# Patient Record
Sex: FEMALE | Race: WHITE | NOT HISPANIC OR LATINO | Employment: FULL TIME | URBAN - METROPOLITAN AREA
[De-identification: names, ages, dates, MRNs, and addresses within clinical notes are randomized per-mention and may not be internally consistent; named-entity substitution may affect disease eponyms.]

---

## 2017-01-10 ENCOUNTER — OFFICE VISIT (OUTPATIENT)
Dept: URGENT CARE | Facility: CLINIC | Age: 52
End: 2017-01-10
Payer: COMMERCIAL

## 2017-01-10 ENCOUNTER — HOSPITAL ENCOUNTER (OUTPATIENT)
Facility: MEDICAL CENTER | Age: 52
End: 2017-01-10
Attending: NURSE PRACTITIONER
Payer: COMMERCIAL

## 2017-01-10 VITALS
WEIGHT: 191 LBS | DIASTOLIC BLOOD PRESSURE: 88 MMHG | SYSTOLIC BLOOD PRESSURE: 126 MMHG | TEMPERATURE: 98.8 F | HEIGHT: 61 IN | OXYGEN SATURATION: 99 % | HEART RATE: 72 BPM | RESPIRATION RATE: 16 BRPM | BODY MASS INDEX: 36.06 KG/M2

## 2017-01-10 DIAGNOSIS — N30.90 CYSTITIS: ICD-10-CM

## 2017-01-10 DIAGNOSIS — N12 PYELONEPHRITIS: ICD-10-CM

## 2017-01-10 LAB
APPEARANCE UR: CLEAR
BILIRUB UR STRIP-MCNC: NEGATIVE MG/DL
COLOR UR AUTO: YELLOW
GLUCOSE UR STRIP.AUTO-MCNC: NEGATIVE MG/DL
KETONES UR STRIP.AUTO-MCNC: NEGATIVE MG/DL
LEUKOCYTE ESTERASE UR QL STRIP.AUTO: NORMAL
NITRITE UR QL STRIP.AUTO: NEGATIVE
PH UR STRIP.AUTO: 6.5 [PH] (ref 5–8)
PROT UR QL STRIP: NEGATIVE MG/DL
RBC UR QL AUTO: NORMAL
SP GR UR STRIP.AUTO: 1.02
UROBILINOGEN UR STRIP-MCNC: 0.2 MG/DL

## 2017-01-10 PROCEDURE — 81002 URINALYSIS NONAUTO W/O SCOPE: CPT | Performed by: NURSE PRACTITIONER

## 2017-01-10 PROCEDURE — 99000 SPECIMEN HANDLING OFFICE-LAB: CPT | Performed by: NURSE PRACTITIONER

## 2017-01-10 PROCEDURE — 96372 THER/PROPH/DIAG INJ SC/IM: CPT | Performed by: NURSE PRACTITIONER

## 2017-01-10 PROCEDURE — 99214 OFFICE O/P EST MOD 30 MIN: CPT | Mod: 25 | Performed by: NURSE PRACTITIONER

## 2017-01-10 PROCEDURE — 87086 URINE CULTURE/COLONY COUNT: CPT

## 2017-01-10 RX ORDER — CEFTRIAXONE 1 G/1
1 INJECTION, POWDER, FOR SOLUTION INTRAMUSCULAR; INTRAVENOUS ONCE
Status: COMPLETED | OUTPATIENT
Start: 2017-01-10 | End: 2017-01-10

## 2017-01-10 RX ORDER — SULFAMETHOXAZOLE AND TRIMETHOPRIM 800; 160 MG/1; MG/1
1 TABLET ORAL 2 TIMES DAILY
Qty: 20 TAB | Refills: 0 | Status: SHIPPED | OUTPATIENT
Start: 2017-01-10 | End: 2017-01-20

## 2017-01-10 RX ADMIN — CEFTRIAXONE 1 G: 1 INJECTION, POWDER, FOR SOLUTION INTRAMUSCULAR; INTRAVENOUS at 17:42

## 2017-01-10 ASSESSMENT — ENCOUNTER SYMPTOMS
CHILLS: 1
VOMITING: 0
EYES NEGATIVE: 1
RESPIRATORY NEGATIVE: 1
CARDIOVASCULAR NEGATIVE: 1
NAUSEA: 1
FLANK PAIN: 1
ABDOMINAL PAIN: 1
FEVER: 1
MYALGIAS: 1

## 2017-01-10 NOTE — MR AVS SNAPSHOT
"Leigh Ann Fox   1/10/2017 4:45 PM   Office Visit   MRN: [id number]    Department:  [facility]   Dept Phone:  [phone]    Description:  Female : 1965   Provider:  Cathey J Hamman, A.P.N.           Reason for Visit     Dysuria blood, bits of flesh, lower abdomen pain and a lot of body aches       Allergies as of 1/10/2017     No Known Allergies      You were diagnosed with     Pyelonephritis   [[id number]]       Cystitis   [[id number]]         Vital Signs     Blood Pressure Pulse Temperature Respirations Height Weight    126/88 mmHg 72 37.1 °C (98.8 °F) 16 1.549 m (5' 1\") 86.637 kg (191 lb)    Body Mass Index Oxygen Saturation Breastfeeding? Smoking Status          36.11 kg/m2 99% No Never Smoker         Basic Information     Date Of Birth Sex Race Ethnicity Preferred Language    1965 Female White Non- English      Health Maintenance        Date Due Completion Dates    IMM DTaP/Tdap/Td Vaccine (1 - Tdap) 1986 ---    PAP SMEAR 1988 ---    MAMMOGRAM 2007 ---    IMM INFLUENZA (1) 2016 ---            Results     POCT Urinalysis      Component Value Standard Range & Units    POC Color YELLOW Negative    POC Appearance CLEAR Negative    POC Leukocyte Esterase MODERATE Negative    POC Nitrites NEGATIVE Negative    POC Urobiligen 0.2 Negative (0.2) mg/dL    POC Protein NEGATIVE Negative mg/dL    POC Urine PH 6.5 5.0 - 8.0    POC Blood MODERATE Negative    POC Specific Gravity 1.020 <1.005 - >1.030    POC Ketones NEGATIVE Negative mg/dL    POC Biliruben NEGATIVE Negative mg/dL    POC Glucose NEGATIVE Negative mg/dL                        Current Immunizations     No immunizations on file.      Below and/or attached are the medications your provider expects you to take. Review all of your home medications and newly ordered medications with your provider and/or pharmacist. Follow medication instructions as directed by your provider and/or pharmacist. Please keep your medication " list with you and share with your provider. Update the information when medications are discontinued, doses are changed, or new medications (including over-the-counter products) are added; and carry medication information at all times in the event of emergency situations     Allergies:  No Known Allergies          Medications  Valid as of: January 10, 2017 -  6:28 PM    Generic Name Brand Name Tablet Size Instructions for use    Sulfamethoxazole-Trimethoprim (Tab) BACTRIM -160 MG Take 1 Tab by mouth 2 times a day.        Sulfamethoxazole-Trimethoprim (Tab) BACTRIM -160 MG Take 1 Tab by mouth 2 times a day for 10 days.        .                 Medicines prescribed today were sent to:     [facility] - [place], [place] - [address]    [address] [place] [address]    Phone: [phone] Fax: [phone]    Open 24 Hours?: No      Medication refill instructions:       If your prescription bottle indicates you have medication refills left, it is not necessary to call your provider’s office. Please contact your pharmacy and they will refill your medication.    If your prescription bottle indicates you do not have any refills left, you may request refills at any time through one of the following ways: The online [employer] system (except Urgent Care), by calling your provider’s office, or by asking your pharmacy to contact your provider’s office with a refill request. Medication refills are processed only during regular business hours and may not be available until the next business day. Your provider may request additional information or to have a follow-up visit with you prior to refilling your medication.   *Please Note: Medication refills are assigned a new Rx number when refilled electronically. Your pharmacy may indicate that no refills were authorized even though a new prescription for the same medication is available at the pharmacy. Please request the medicine by name with the  pharmacy before contacting your provider for a refill.        Your To Do List     Future Labs/Procedures Complete By Expires    URINE CULTURE(NEW)  As directed 1/10/2018      Instructions    Urinary Tract Infection  Urinary tract infections (UTIs) can develop anywhere along your urinary tract. Your urinary tract is your body's drainage system for removing wastes and extra water. Your urinary tract includes two kidneys, two ureters, a bladder, and a urethra. Your kidneys are a pair of bean-shaped organs. Each kidney is about the size of your fist. They are located below your ribs, one on each side of your spine.  CAUSES  Infections are caused by microbes, which are microscopic organisms, including fungi, viruses, and bacteria. These organisms are so small that they can only be seen through a microscope. Bacteria are the microbes that most commonly cause UTIs.  SYMPTOMS   Symptoms of UTIs may vary by age and gender of the patient and by the location of the infection. Symptoms in young women typically include a frequent and intense urge to urinate and a painful, burning feeling in the bladder or urethra during urination. Older women and men are more likely to be tired, shaky, and weak and have muscle aches and abdominal pain. A fever may mean the infection is in your kidneys. Other symptoms of a kidney infection include pain in your back or sides below the ribs, nausea, and vomiting.  DIAGNOSIS  To diagnose a UTI, your caregiver will ask you about your symptoms. Your caregiver also will ask to provide a urine sample. The urine sample will be tested for bacteria and white blood cells. White blood cells are made by your body to help fight infection.  TREATMENT   Typically, UTIs can be treated with medication. Because most UTIs are caused by a bacterial infection, they usually can be treated with the use of antibiotics. The choice of antibiotic and length of treatment depend on your symptoms and the type of bacteria  causing your infection.  HOME CARE INSTRUCTIONS  · If you were prescribed antibiotics, take them exactly as your caregiver instructs you. Finish the medication even if you feel better after you have only taken some of the medication.  · Drink enough water and fluids to keep your urine clear or pale yellow.  · Avoid caffeine, tea, and carbonated beverages. They tend to irritate your bladder.  · Empty your bladder often. Avoid holding urine for long periods of time.  · Empty your bladder before and after sexual intercourse.  · After a bowel movement, women should cleanse from front to back. Use each tissue only once.  SEEK MEDICAL CARE IF:   · You have back pain.  · You develop a fever.  · Your symptoms do not begin to resolve within 3 days.  SEEK IMMEDIATE MEDICAL CARE IF:   · You have severe back pain or lower abdominal pain.  · You develop chills.  · You have nausea or vomiting.  · You have continued burning or discomfort with urination.  MAKE SURE YOU:   · Understand these instructions.  · Will watch your condition.  · Will get help right away if you are not doing well or get worse.     This information is not intended to replace advice given to you by your health care provider. Make sure you discuss any questions you have with your health care provider.     Document Released: 09/27/2006 Document Revised: 01/08/2016 Document Reviewed: 01/25/2013  [employer] Interactive Patient Education ©2016 Elsevier Inc.  Pyelonephritis, Adult  Pyelonephritis is a kidney infection. In general, there are 2 main types of pyelonephritis:  · Infections that come on quickly without any warning (acute pyelonephritis).  · Infections that persist for a long period of time (chronic pyelonephritis).  CAUSES   Two main causes of pyelonephritis are:  · Bacteria traveling from the bladder to the kidney. This is a problem especially in pregnant women. The urine in the bladder can become filled with bacteria from multiple causes,  including:  · Inflammation of the prostate gland (prostatitis).  · Sexual intercourse in females.  · Bladder infection (cystitis).  · Bacteria traveling from the bloodstream to the tissue part of the kidney.  Problems that may increase your risk of getting a kidney infection include:  · Diabetes.  · Kidney stones or bladder stones.  · Cancer.  · Catheters placed in the bladder.  · Other abnormalities of the kidney or ureter.  SYMPTOMS   · Abdominal pain.  · Pain in the side or flank area.  · Fever.  · Chills.  · Upset stomach.  · Blood in the urine (dark urine).  · Frequent urination.  · Strong or persistent urge to urinate.  · Burning or stinging when urinating.  DIAGNOSIS   Your caregiver may diagnose your kidney infection based on your symptoms. A urine sample may also be taken.  TREATMENT   In general, treatment depends on how severe the infection is.   · If the infection is mild and caught early, your caregiver may treat you with oral antibiotics and send you home.  · If the infection is more severe, the bacteria may have gotten into the bloodstream. This will require intravenous (IV) antibiotics and a hospital stay. Symptoms may include:  ¨ High fever.  ¨ Severe flank pain.  ¨ Shaking chills.  · Even after a hospital stay, your caregiver may require you to be on oral antibiotics for a period of time.  · Other treatments may be required depending upon the cause of the infection.  HOME CARE INSTRUCTIONS   · Take your antibiotics as directed. Finish them even if you start to feel better.  · Make an appointment to have your urine checked to make sure the infection is gone.  · Drink enough fluids to keep your urine clear or pale yellow.  · Take medicines for the bladder if you have urgency and frequency of urination as directed by your caregiver.  SEEK IMMEDIATE MEDICAL CARE IF:   · You have a fever or persistent symptoms for more than 2-3 days.  · You have a fever and your symptoms suddenly get worse.  · You are  unable to take your antibiotics or fluids.  · You develop shaking chills.  · You experience extreme weakness or fainting.  · There is no improvement after 2 days of treatment.  MAKE SURE YOU:  · Understand these instructions.  · Will watch your condition.  · Will get help right away if you are not doing well or get worse.     This information is not intended to replace advice given to you by your health care provider. Make sure you discuss any questions you have with your health care provider.     Document Released: 12/18/2006 Document Revised: 06/18/2013 Document Reviewed: 05/23/2012  [employer] Interactive Patient Education ©2016 Elsevier Inc.            Redeem[employer]Get Access Code: [email or web address]  Expires: 1/25/2017  8:47 AM    Your email address is not on file at [facility].  Email Addresses are required for you to sign up for Redeem[employer]Get, please contact [phone] to verify your personal information and to provide your email address prior to attempting to register for Redeem&Get.    Leigh Ann Fox  [id number]  [place] [address]    Redeem&Get  A secure, online tool to manage your health information     [facility]’s Redeem&Get® is a secure, online tool that connects you to your personalized health information from the privacy of your home -- day or night - making it very easy for you to manage your healthcare. Once the activation process is completed, you can even access your medical information using the Redeem&Get a[place], which is available for free in the [employer] [place] store or Google Play store.     To learn more about Redeem&Get, visit [email or web address]    There are two levels of access available (as shown below):   My Chart Features  [facility] Primary Care Doctor [facility]  Specialists [facility]  Urgent  Care Non-[facility] Primary Care Doctor   Email your healthcare team securely and privately 24/7 X X X    Manage appointments: schedule your next appointment; view details of past/upcoming appointments X      Request  prescription refills. X      View recent personal medical records, including lab and immunizations X X X X   View health record, including health history, allergies, medications X X X X   Read reports about your outpatient visits, procedures, consult and ER notes X X X X   See your discharge summary, which is a recap of your hospital and/or ER visit that includes your diagnosis, lab results, and care plan X X  X     How to register for [employer]:  Once your e-mail address has been verified, follow the following steps to sign up for [employer].     1. Go to  [email or web address]  2. Click on the Sign Up Now box, which takes you to the New Member Sign Up page. You will need to provide the following information:  a. Enter your [employer] Access Code exactly as it appears at the top of this page. (You will not need to use this code after you’ve completed the sign-up process. If you do not sign up before the expiration date, you must request a new code.)   b. Enter your date of birth.   c. Enter your home email address.   d. Click Submit, and follow the next screen’s instructions.  3. Create a [employer] Bridge ID. This will be your [employer] login ID and cannot be changed, so think of one that is secure and easy to remember.  4. Create a [employer] password. You can change your password at any time.  5. Enter your Password Reset Question and Answer. This can be used at a later time if you forget your password.   6. Enter your e-mail address. This allows you to receive e-mail notifications when new information is available in [employer].  7. Click Sign Up. You can now view your health information.    For assistance activating your [employer] account, call [phone]

## 2017-01-11 NOTE — PATIENT INSTRUCTIONS
Urinary Tract Infection  Urinary tract infections (UTIs) can develop anywhere along your urinary tract. Your urinary tract is your body's drainage system for removing wastes and extra water. Your urinary tract includes two kidneys, two ureters, a bladder, and a urethra. Your kidneys are a pair of bean-shaped organs. Each kidney is about the size of your fist. They are located below your ribs, one on each side of your spine.  CAUSES  Infections are caused by microbes, which are microscopic organisms, including fungi, viruses, and bacteria. These organisms are so small that they can only be seen through a microscope. Bacteria are the microbes that most commonly cause UTIs.  SYMPTOMS   Symptoms of UTIs may vary by age and gender of the patient and by the location of the infection. Symptoms in young women typically include a frequent and intense urge to urinate and a painful, burning feeling in the bladder or urethra during urination. Older women and men are more likely to be tired, shaky, and weak and have muscle aches and abdominal pain. A fever may mean the infection is in your kidneys. Other symptoms of a kidney infection include pain in your back or sides below the ribs, nausea, and vomiting.  DIAGNOSIS  To diagnose a UTI, your caregiver will ask you about your symptoms. Your caregiver also will ask to provide a urine sample. The urine sample will be tested for bacteria and white blood cells. White blood cells are made by your body to help fight infection.  TREATMENT   Typically, UTIs can be treated with medication. Because most UTIs are caused by a bacterial infection, they usually can be treated with the use of antibiotics. The choice of antibiotic and length of treatment depend on your symptoms and the type of bacteria causing your infection.  HOME CARE INSTRUCTIONS  · If you were prescribed antibiotics, take them exactly as your caregiver instructs you. Finish the medication even if you feel better after you  have only taken some of the medication.  · Drink enough water and fluids to keep your urine clear or pale yellow.  · Avoid caffeine, tea, and carbonated beverages. They tend to irritate your bladder.  · Empty your bladder often. Avoid holding urine for long periods of time.  · Empty your bladder before and after sexual intercourse.  · After a bowel movement, women should cleanse from front to back. Use each tissue only once.  SEEK MEDICAL CARE IF:   · You have back pain.  · You develop a fever.  · Your symptoms do not begin to resolve within 3 days.  SEEK IMMEDIATE MEDICAL CARE IF:   · You have severe back pain or lower abdominal pain.  · You develop chills.  · You have nausea or vomiting.  · You have continued burning or discomfort with urination.  MAKE SURE YOU:   · Understand these instructions.  · Will watch your condition.  · Will get help right away if you are not doing well or get worse.     This information is not intended to replace advice given to you by your health care provider. Make sure you discuss any questions you have with your health care provider.     Document Released: 09/27/2006 Document Revised: 01/08/2016 Document Reviewed: 01/25/2013  [employer] Interactive Patient Education ©2016 Elsevier Inc.  Pyelonephritis, Adult  Pyelonephritis is a kidney infection. In general, there are 2 main types of pyelonephritis:  · Infections that come on quickly without any warning (acute pyelonephritis).  · Infections that persist for a long period of time (chronic pyelonephritis).  CAUSES   Two main causes of pyelonephritis are:  · Bacteria traveling from the bladder to the kidney. This is a problem especially in pregnant women. The urine in the bladder can become filled with bacteria from multiple causes, including:  · Inflammation of the prostate gland (prostatitis).  · Sexual intercourse in females.  · Bladder infection (cystitis).  · Bacteria traveling from the bloodstream to the tissue part of the  kidney.  Problems that may increase your risk of getting a kidney infection include:  · Diabetes.  · Kidney stones or bladder stones.  · Cancer.  · Catheters placed in the bladder.  · Other abnormalities of the kidney or ureter.  SYMPTOMS   · Abdominal pain.  · Pain in the side or flank area.  · Fever.  · Chills.  · Upset stomach.  · Blood in the urine (dark urine).  · Frequent urination.  · Strong or persistent urge to urinate.  · Burning or stinging when urinating.  DIAGNOSIS   Your caregiver may diagnose your kidney infection based on your symptoms. A urine sample may also be taken.  TREATMENT   In general, treatment depends on how severe the infection is.   · If the infection is mild and caught early, your caregiver may treat you with oral antibiotics and send you home.  · If the infection is more severe, the bacteria may have gotten into the bloodstream. This will require intravenous (IV) antibiotics and a hospital stay. Symptoms may include:  ¨ High fever.  ¨ Severe flank pain.  ¨ Shaking chills.  · Even after a hospital stay, your caregiver may require you to be on oral antibiotics for a period of time.  · Other treatments may be required depending upon the cause of the infection.  HOME CARE INSTRUCTIONS   · Take your antibiotics as directed. Finish them even if you start to feel better.  · Make an appointment to have your urine checked to make sure the infection is gone.  · Drink enough fluids to keep your urine clear or pale yellow.  · Take medicines for the bladder if you have urgency and frequency of urination as directed by your caregiver.  SEEK IMMEDIATE MEDICAL CARE IF:   · You have a fever or persistent symptoms for more than 2-3 days.  · You have a fever and your symptoms suddenly get worse.  · You are unable to take your antibiotics or fluids.  · You develop shaking chills.  · You experience extreme weakness or fainting.  · There is no improvement after 2 days of treatment.  MAKE SURE  YOU:  · Understand these instructions.  · Will watch your condition.  · Will get help right away if you are not doing well or get worse.     This information is not intended to replace advice given to you by your health care provider. Make sure you discuss any questions you have with your health care provider.     Document Released: 12/18/2006 Document Revised: 06/18/2013 Document Reviewed: 05/23/2012  [employer] Interactive Patient Education ©2016 Elsevier Inc.

## 2017-01-11 NOTE — PROGRESS NOTES
"Subjective:      Leigh Ann Fox is a 51 y.o. female who presents with Dysuria    PMH: no history of chronic illness    Social hx: non-smoker,     Family hx: reviwed and not pertinnet to today's visit     This patient presents today with complaint of dysuria, urgency, frequency, and blood in her urine along with lower back pain, body aches and chills. Symptoms started over the last 24 hours. She states that the first time she had these symptoms was on January 1. She took some Cystex over-the-counter and states that her symptoms resolved. They returned yesterday. She reports feeling achy all over and generally unwell. She has had nausea but no vomiting.          Dysuria   This is a new problem. The current episode started yesterday. The problem occurs every urination. The problem has been gradually worsening. The quality of the pain is described as burning and aching. The pain is moderate. There has been no fever. Associated symptoms include chills, flank pain, frequency, hematuria, nausea and urgency. Pertinent negatives include no vomiting. Treatments tried: cystex  The treatment provided no relief.       Review of Systems   Constitutional: Positive for fever, chills and malaise/fatigue.   Eyes: Negative.    Respiratory: Negative.    Cardiovascular: Negative.    Gastrointestinal: Positive for nausea and abdominal pain. Negative for vomiting.   Genitourinary: Positive for dysuria, urgency, frequency, hematuria and flank pain.   Musculoskeletal: Positive for myalgias.   Skin: Negative.      All other systems reviewed and are negative      Objective:     /88 mmHg  Pulse 72  Temp(Src) 37.1 °C (98.8 °F)  Resp 16  Ht 1.549 m (5' 1\")  Wt 86.637 kg (191 lb)  BMI 36.11 kg/m2  SpO2 99%  Breastfeeding? No     Physical Exam   Constitutional: She is oriented to person, place, and time. She appears well-developed and well-nourished. No distress.   Eyes: EOM are normal. Pupils are equal, round, and reactive to " light.   Neck: Normal range of motion. Neck supple.   Cardiovascular: Normal rate and regular rhythm.    Pulmonary/Chest: Effort normal and breath sounds normal.   Abdominal: Soft. There is tenderness.   Positive suprapubic tenderness  Bilateral CVA tenderness    Neurological: She is alert and oriented to person, place, and time.   Skin: Skin is warm and dry. She is not diaphoretic.   Psychiatric: She has a normal mood and affect. Her behavior is normal.   Vitals reviewed.    UA: positive blood, positive leukocytes          Assessment/Plan:   Pyelonephritis  Cystitis   -Rocephin IM   -Bactrim DS   -urine culture   -push fluids   -written discharge instructions given   -ER preautions for worsening of symtoms.   There are no diagnoses linked to this encounter.

## 2017-01-12 LAB
BACTERIA UR CULT: NORMAL
SIGNIFICANT IND 70042: NORMAL
SOURCE SOURCE: NORMAL

## 2017-01-13 ENCOUNTER — TELEPHONE (OUTPATIENT)
Dept: URGENT CARE | Facility: PHYSICIAN GROUP | Age: 52
End: 2017-01-13

## 2017-01-13 NOTE — TELEPHONE ENCOUNTER
Attempted telephone follow-up with this patient. No answer. Left voice mail requesting a call back. Patient's urine culture shows growth of skin bridgett only. She is not currently registered for [employer].